# Patient Record
Sex: FEMALE | Race: WHITE | Employment: OTHER | URBAN - METROPOLITAN AREA
[De-identification: names, ages, dates, MRNs, and addresses within clinical notes are randomized per-mention and may not be internally consistent; named-entity substitution may affect disease eponyms.]

---

## 2019-08-27 ENCOUNTER — HOSPITAL ENCOUNTER (OUTPATIENT)
Dept: INFUSION THERAPY | Age: 74
Discharge: HOME | End: 2019-08-27
Payer: MEDICARE

## 2019-08-27 VITALS
SYSTOLIC BLOOD PRESSURE: 144 MMHG | OXYGEN SATURATION: 96 % | RESPIRATION RATE: 18 BRPM | TEMPERATURE: 98.3 F | DIASTOLIC BLOOD PRESSURE: 84 MMHG | HEART RATE: 66 BPM

## 2019-08-27 PROCEDURE — 96372 THER/PROPH/DIAG INJ SC/IM: CPT

## 2019-08-27 PROCEDURE — 74011250636 HC RX REV CODE- 250/636: Performed by: NURSE PRACTITIONER

## 2019-08-27 RX ADMIN — BENRALIZUMAB 30 MG: 30 INJECTION, SOLUTION SUBCUTANEOUS at 13:23

## 2019-08-27 NOTE — PROGRESS NOTES
[facility] Progress Note    Date: 2019    Name: Aly Nassar    MRN: [id number]         : 1945     Fasnera injection Q 4 weeks 1 of 3      Ms. Gentile arrived to [facility] at . Ms. Lou Hartman was assessed and education was provided. Ms. Leo Carr vitals were reviewed. Visit Vitals  /84 (BP 1 Location: Right arm, BP Patient Position: Sitting)   Pulse 66   Temp 98.3 °F (36.8 °C)   Resp 18   SpO2 96%   Breastfeeding? No       Fasenra 30 mg was administered as ordered SQ in patient's Left upper arm ( left). Ms. Lou Hartman tolerated well without complaints. Ms. Lou Hartman was discharged from [place] [address] in stable condition at 1400. She is to return on 2019 at 1300 for her next appointment.     Bettye Blackwood RN  2019

## 2019-09-24 ENCOUNTER — HOSPITAL ENCOUNTER (OUTPATIENT)
Dept: INFUSION THERAPY | Age: 74
Discharge: HOME | End: 2019-09-24
Payer: MEDICARE

## 2019-09-24 VITALS
OXYGEN SATURATION: 97 % | RESPIRATION RATE: 18 BRPM | SYSTOLIC BLOOD PRESSURE: 128 MMHG | DIASTOLIC BLOOD PRESSURE: 74 MMHG | HEART RATE: 59 BPM | TEMPERATURE: 97.7 F

## 2019-09-24 PROCEDURE — 96372 THER/PROPH/DIAG INJ SC/IM: CPT

## 2019-09-24 PROCEDURE — 74011250636 HC RX REV CODE- 250/636: Performed by: NURSE PRACTITIONER

## 2019-09-24 RX ADMIN — BENRALIZUMAB 30 MG: 30 INJECTION, SOLUTION SUBCUTANEOUS at 13:09

## 2019-09-24 NOTE — PROGRESS NOTES
[facility] Progress Note    Date: 2019    Name: Lorie Peralta    MRN: [id number]         : 1945     Fasnera injection Q 4 weeks 2 of 3      Ms. Gentile arrived to [facility] at [id number]. Ms. Jesi Lloyd was assessed and education was provided. Ms. Ander Olivares vitals were reviewed. Visit Vitals  /74 (BP 1 Location: Left arm, BP Patient Position: Sitting)   Pulse (!) 59   Temp 97.7 °F (36.5 °C)   Resp 18   SpO2 97%       Fasenra 30 mg was administered as ordered SQ in patient's Left upper arm ( left). Ms. Jesi Lloyd tolerated well without complaints. Ms. Jesi Lloyd was discharged from [place] [address] in stable condition at 1320. She is to return on 10/22/2019 at 1100 for her next appointment.     Brett De Souza, RN, RN  2019

## 2019-10-22 ENCOUNTER — HOSPITAL ENCOUNTER (OUTPATIENT)
Dept: INFUSION THERAPY | Age: 74
Discharge: HOME | End: 2019-10-22
Payer: MEDICARE

## 2019-10-22 VITALS
RESPIRATION RATE: 18 BRPM | SYSTOLIC BLOOD PRESSURE: 113 MMHG | HEART RATE: 65 BPM | DIASTOLIC BLOOD PRESSURE: 72 MMHG | TEMPERATURE: 98 F | OXYGEN SATURATION: 95 %

## 2019-10-22 PROCEDURE — 74011250636 HC RX REV CODE- 250/636: Performed by: NURSE PRACTITIONER

## 2019-10-22 PROCEDURE — 96372 THER/PROPH/DIAG INJ SC/IM: CPT

## 2019-10-22 RX ADMIN — BENRALIZUMAB 30 MG: 30 INJECTION, SOLUTION SUBCUTANEOUS at 11:19

## 2019-10-22 NOTE — PROGRESS NOTES
[facility] Progress Note    Date: 2019    Name: Rose Mary Bauer    MRN: [id number]         : 1945     Fasnera injection Q 4 weeks 3 of 3      Ms. Gentile arrived to [facility] at 1100. Ms. Kimi Cervantes was assessed and education was provided. Ms. Domonique Culver vitals were reviewed. Visit Vitals  /72 (BP 1 Location: Left arm, BP Patient Position: Sitting)   Pulse 65   Temp 98 °F (36.7 °C)   Resp 18   SpO2 95%       Fasenra 30 mg was administered as ordered SQ in patient's Left upper arm ( left). Ms. Kimi Cervantes tolerated well without complaints. Ms. Kimi Cervantes was discharged from [place] [address] in stable condition at 1125. She is to return on 2019 at 1100 for her next appointment.     Moraima Valdes RN  2019

## 2019-12-10 PROBLEM — J45.50 SEVERE PERSISTENT ASTHMA WITHOUT COMPLICATION: Status: ACTIVE | Noted: 2019-12-10

## 2019-12-17 ENCOUNTER — HOSPITAL ENCOUNTER (OUTPATIENT)
Dept: INFUSION THERAPY | Age: 74
Discharge: HOME | End: 2019-12-17
Payer: MEDICARE

## 2019-12-17 VITALS
RESPIRATION RATE: 18 BRPM | OXYGEN SATURATION: 97 % | DIASTOLIC BLOOD PRESSURE: 72 MMHG | SYSTOLIC BLOOD PRESSURE: 131 MMHG | TEMPERATURE: 97.7 F | HEART RATE: 69 BPM

## 2019-12-17 DIAGNOSIS — J45.50 SEVERE PERSISTENT ASTHMA WITHOUT COMPLICATION: ICD-10-CM

## 2019-12-17 DIAGNOSIS — J45.50 SEVERE PERSISTENT ASTHMA WITHOUT COMPLICATION: Primary | ICD-10-CM

## 2019-12-17 PROCEDURE — 96372 THER/PROPH/DIAG INJ SC/IM: CPT

## 2019-12-17 PROCEDURE — 74011250636 HC RX REV CODE- 250/636: Performed by: NURSE PRACTITIONER

## 2019-12-17 RX ORDER — RAMIPRIL 10 MG/1
10 CAPSULE ORAL DAILY
COMMUNITY
End: 2021-05-04

## 2019-12-17 RX ORDER — FISH OIL/DHA/EPA 1200-144MG
CAPSULE ORAL
COMMUNITY

## 2019-12-17 RX ORDER — AMPICILLIN TRIHYDRATE 250 MG
600 CAPSULE ORAL
COMMUNITY

## 2019-12-17 RX ORDER — DICLOFENAC SODIUM 10 MG/G
1 GEL TOPICAL AS NEEDED
COMMUNITY

## 2019-12-17 RX ORDER — MENTHOL 3.2 MG
1 LOZENGE MUCOUS MEMBRANE DAILY
COMMUNITY

## 2019-12-17 RX ORDER — ZINC GLUCONATE 10 MG
LOZENGE ORAL
COMMUNITY

## 2019-12-17 RX ORDER — NEBIVOLOL 5 MG/1
5 TABLET ORAL
COMMUNITY
End: 2021-01-12

## 2019-12-17 RX ORDER — ARFORMOTEROL TARTRATE 15 UG/2ML
15 SOLUTION RESPIRATORY (INHALATION) 2 TIMES DAILY
COMMUNITY

## 2019-12-17 RX ORDER — OXYMETAZOLINE HCL 0.05 %
3 SPRAY, NON-AEROSOL (ML) NASAL 3 TIMES DAILY
COMMUNITY

## 2019-12-17 RX ORDER — SPIRONOLACTONE 25 MG/1
50 TABLET ORAL
COMMUNITY

## 2019-12-17 RX ORDER — ERGOCALCIFEROL 1.25 MG/1
50000 CAPSULE ORAL 2 TIMES WEEKLY
COMMUNITY

## 2019-12-17 RX ORDER — MONTELUKAST SODIUM 10 MG/1
10 TABLET ORAL DAILY
COMMUNITY

## 2019-12-17 RX ADMIN — BENRALIZUMAB 30 MG: 30 INJECTION, SOLUTION SUBCUTANEOUS at 11:12

## 2019-12-17 NOTE — PROGRESS NOTES
[facility] Progress Note    Date: 2019    Name: Placido Sam    MRN: [id number]         : 1945     Fasnera injection Q 8 weeks      Ms. Gentile arrived to [facility] at . Ms. Luisito Morgan was assessed and education was provided. Ms. Wiliam Houston vitals were reviewed. Visit Vitals  /72 (BP 1 Location: Left arm, BP Patient Position: Sitting)   Pulse 69   Temp 97.7 °F (36.5 °C)   Resp 18   SpO2 97%       Fasenra 30 mg was administered as ordered SQ in patient's Left upper arm. Ms. Luisito Morgan tolerated well without complaints. Ms. Luisito Morgan was discharged from [place] [address] in stable condition at 1115. She is to return on 20 at 1100 for her next appointment.     Jalil Huynh RN  2019  1111

## 2020-02-09 DIAGNOSIS — J45.50 SEVERE PERSISTENT ASTHMA WITHOUT COMPLICATION: ICD-10-CM

## 2020-02-11 ENCOUNTER — HOSPITAL ENCOUNTER (OUTPATIENT)
Dept: INFUSION THERAPY | Age: 75
Discharge: HOME | End: 2020-02-11
Payer: MEDICARE

## 2020-02-11 VITALS
HEART RATE: 69 BPM | OXYGEN SATURATION: 96 % | DIASTOLIC BLOOD PRESSURE: 73 MMHG | SYSTOLIC BLOOD PRESSURE: 128 MMHG | TEMPERATURE: 97.9 F | RESPIRATION RATE: 17 BRPM

## 2020-02-11 DIAGNOSIS — J45.50 SEVERE PERSISTENT ASTHMA WITHOUT COMPLICATION: Primary | ICD-10-CM

## 2020-02-11 PROCEDURE — 74011250636 HC RX REV CODE- 250/636: Performed by: NURSE PRACTITIONER

## 2020-02-11 PROCEDURE — 96372 THER/PROPH/DIAG INJ SC/IM: CPT

## 2020-02-11 RX ADMIN — BENRALIZUMAB 30 MG: 30 INJECTION, SOLUTION SUBCUTANEOUS at 12:00

## 2020-02-11 NOTE — PROGRESS NOTES
[facility] Progress Note    Date: 2020    Name: George Desir    MRN: [id number]         : 1945     Fasnera injection Q 8 weeks      Ms. Gentile arrived to [facility] at [address]. Ms. Randy Patel was assessed and education was provided. Ms. Benavidez Major vitals were reviewed. Visit Vitals  /73 (BP 1 Location: Left arm, BP Patient Position: Sitting)   Pulse 69   Temp 97.9 °F (36.6 °C)   Resp 17   SpO2 96%       Fasenra 30 mg was administered as ordered SQ in patient's Left upper arm. Ms. Randy Patel tolerated well without complaints. Ms. Randy Patel was discharged from [place] [address] in stable condition at 1125. She is to return on 2020 at 1100 for her next appointment.     Ángela Agarwal RN  2020  1125

## 2020-04-05 DIAGNOSIS — J45.50 SEVERE PERSISTENT ASTHMA WITHOUT COMPLICATION: ICD-10-CM

## 2020-04-07 ENCOUNTER — HOSPITAL ENCOUNTER (OUTPATIENT)
Dept: INFUSION THERAPY | Age: 75
Discharge: HOME | End: 2020-04-07
Payer: MEDICARE

## 2020-04-07 VITALS
RESPIRATION RATE: 17 BRPM | TEMPERATURE: 98.3 F | SYSTOLIC BLOOD PRESSURE: 130 MMHG | HEART RATE: 74 BPM | DIASTOLIC BLOOD PRESSURE: 74 MMHG | OXYGEN SATURATION: 99 %

## 2020-04-07 DIAGNOSIS — J45.50 SEVERE PERSISTENT ASTHMA WITHOUT COMPLICATION: Primary | ICD-10-CM

## 2020-04-07 PROCEDURE — 74011250636 HC RX REV CODE- 250/636: Performed by: NURSE PRACTITIONER

## 2020-04-07 PROCEDURE — 96372 THER/PROPH/DIAG INJ SC/IM: CPT

## 2020-04-07 RX ADMIN — BENRALIZUMAB 30 MG: 30 INJECTION, SOLUTION SUBCUTANEOUS at 11:08

## 2020-04-07 NOTE — PROGRESS NOTES
[facility] Progress Note    Date: 2020    Name: Johan Davis    MRN: [id number]         : 1945     Fasnera injection Q 8 weeks      Ms. Gentile arrived to [facility] at 12. Ms. Justo Chavez was assessed and education was provided. Ms. Jose Enrique Grady vitals were reviewed. Visit Vitals  /74 (BP 1 Location: Left arm, BP Patient Position: Sitting)   Pulse 74   Temp 98.3 °F (36.8 °C)   Resp 17   SpO2 99%   Breastfeeding No       Fasenra 30 mg was administered as ordered SQ in patient's Left upper arm. Ms. Justo Chavez tolerated well without complaints. Ms. Justo Chavez was discharged from [facility] in stable condition at 1115. She is to return on 2020 at 56 for her next appointment.     Sarah Mustafa RN  2020  1115

## 2020-05-31 DIAGNOSIS — J45.50 SEVERE PERSISTENT ASTHMA WITHOUT COMPLICATION: ICD-10-CM

## 2020-06-02 ENCOUNTER — HOSPITAL ENCOUNTER (OUTPATIENT)
Dept: INFUSION THERAPY | Age: 75
Discharge: HOME | End: 2020-06-02
Payer: MEDICARE

## 2020-06-02 VITALS
SYSTOLIC BLOOD PRESSURE: 148 MMHG | HEART RATE: 66 BPM | OXYGEN SATURATION: 95 % | DIASTOLIC BLOOD PRESSURE: 81 MMHG | TEMPERATURE: 97.8 F | RESPIRATION RATE: 18 BRPM

## 2020-06-02 DIAGNOSIS — J45.50 SEVERE PERSISTENT ASTHMA WITHOUT COMPLICATION: Primary | ICD-10-CM

## 2020-06-02 PROCEDURE — 96372 THER/PROPH/DIAG INJ SC/IM: CPT

## 2020-06-02 PROCEDURE — 74011250636 HC RX REV CODE- 250/636: Performed by: NURSE PRACTITIONER

## 2020-06-02 RX ADMIN — BENRALIZUMAB 30 MG: 30 INJECTION, SOLUTION SUBCUTANEOUS at 10:33

## 2020-07-26 DIAGNOSIS — J45.50 SEVERE PERSISTENT ASTHMA WITHOUT COMPLICATION: ICD-10-CM

## 2020-07-28 ENCOUNTER — HOSPITAL ENCOUNTER (OUTPATIENT)
Dept: INFUSION THERAPY | Age: 75
Discharge: HOME | End: 2020-07-28
Payer: MEDICARE

## 2020-07-28 VITALS
RESPIRATION RATE: 18 BRPM | SYSTOLIC BLOOD PRESSURE: 132 MMHG | DIASTOLIC BLOOD PRESSURE: 72 MMHG | TEMPERATURE: 98.2 F | OXYGEN SATURATION: 95 % | HEART RATE: 67 BPM

## 2020-07-28 DIAGNOSIS — J45.50 SEVERE PERSISTENT ASTHMA WITHOUT COMPLICATION: Primary | ICD-10-CM

## 2020-07-28 PROCEDURE — 74011250636 HC RX REV CODE- 250/636: Performed by: NURSE PRACTITIONER

## 2020-07-28 PROCEDURE — 96372 THER/PROPH/DIAG INJ SC/IM: CPT

## 2020-07-28 RX ADMIN — BENRALIZUMAB 30 MG: 30 INJECTION, SOLUTION SUBCUTANEOUS at 11:10

## 2020-07-28 NOTE — PROGRESS NOTES
[facility] Progress Note    Date: 2020    Name: Tariq Mead    MRN: [id number]         : 1945     Fasnera injection Q 8 weeks      Ms. Gentile arrived to [place] at 12    MsMegan Gentile was assessed and education was provided. Ms. Babita Correa vitals were reviewed. Visit Vitals  /72 (BP 1 Location: Left arm, BP Patient Position: Sitting)   Pulse 67   Temp 98.2 °F (36.8 °C)   Resp 18   SpO2 95%       Fasenra 30 mg was administered as ordered SQ in patient's left upper arm. Ms. Nina Faustin tolerated well without complaints. Ms. Nina Faustin was discharged from [facility] 58 in stable condition at 1115  She is to return on 2020 at 56 for her next appointment.     Marina Manning RN  2020  1118

## 2020-09-22 ENCOUNTER — HOSPITAL ENCOUNTER (OUTPATIENT)
Dept: INFUSION THERAPY | Age: 75
Discharge: HOME | End: 2020-09-22
Payer: MEDICARE

## 2020-09-22 VITALS
OXYGEN SATURATION: 97 % | RESPIRATION RATE: 18 BRPM | DIASTOLIC BLOOD PRESSURE: 86 MMHG | TEMPERATURE: 97.1 F | HEART RATE: 89 BPM | SYSTOLIC BLOOD PRESSURE: 131 MMHG

## 2020-09-22 DIAGNOSIS — J45.50 SEVERE PERSISTENT ASTHMA WITHOUT COMPLICATION: Primary | ICD-10-CM

## 2020-09-22 PROCEDURE — 74011250636 HC RX REV CODE- 250/636: Performed by: NURSE PRACTITIONER

## 2020-09-22 PROCEDURE — 96372 THER/PROPH/DIAG INJ SC/IM: CPT

## 2020-09-22 RX ADMIN — BENRALIZUMAB 30 MG: 30 INJECTION, SOLUTION SUBCUTANEOUS at 10:58

## 2020-09-22 NOTE — PROGRESS NOTES
[facility] Progress Note    Date: 2020    Name: Che Shelby    MRN: [id number]         : 1945     Fasnera injection Q 8 weeks      Ms. Gentile arrived to [facility] at 1055. Ms. Corrinne He was assessed and education was provided. Ms. David Memos vitals were reviewed. Visit Vitals  /86 (BP 1 Location: Left arm, BP Patient Position: Sitting)   Pulse 89   Temp 97.1 °F (36.2 °C)   Resp 18   SpO2 97%       Fasenra 30 mg was administered as ordered SQ in patient's left upper arm. Ms. Corrinne He tolerated well without complaints. Ms. Corrinne He was discharged from [place] [address] in stable condition at   She is to return on 2020 at 56 for her next appointment.     Rodrigo Beasley RN  2020  1100

## 2020-11-17 ENCOUNTER — HOSPITAL ENCOUNTER (OUTPATIENT)
Dept: INFUSION THERAPY | Age: 75
Discharge: HOME | End: 2020-11-17
Payer: MEDICARE

## 2020-11-17 VITALS
RESPIRATION RATE: 18 BRPM | TEMPERATURE: 98.2 F | HEART RATE: 95 BPM | DIASTOLIC BLOOD PRESSURE: 69 MMHG | OXYGEN SATURATION: 97 % | SYSTOLIC BLOOD PRESSURE: 147 MMHG

## 2020-11-17 DIAGNOSIS — J45.50 SEVERE PERSISTENT ASTHMA WITHOUT COMPLICATION: Primary | ICD-10-CM

## 2020-11-17 PROCEDURE — 74011250636 HC RX REV CODE- 250/636: Performed by: NURSE PRACTITIONER

## 2020-11-17 PROCEDURE — 96372 THER/PROPH/DIAG INJ SC/IM: CPT

## 2020-11-17 RX ADMIN — BENRALIZUMAB 30 MG: 30 INJECTION, SOLUTION SUBCUTANEOUS at 10:47

## 2020-11-17 NOTE — PROGRESS NOTES
[facility] Progress Note    Date: 2020    Name: Genet Churchill    MRN: [id number]         : 1945     Fasnera injection Q 8 weeks      Ms. Gentile arrived to [place] at [phone]. Ms. Lazaro Bhatia was assessed and education was provided. Ms. Castro Isabel vitals were reviewed. Visit Vitals  BP (!) 147/69 (BP 1 Location: Left arm, BP Patient Position: Sitting)   Pulse 95   Temp 98.2 °F (36.8 °C)   Resp 18   SpO2 97%       Fasenra 30 mg was administered as ordered SQ in patient's left upper arm. Ms. Lazaro Bhatia tolerated well without complaints. Ms. Lazaro Bhatia was discharged from [place] [address] in stable condition at 1050. She is to return on 2021 at 1330 for her next appointment.     Chad Jameson RN  2020  1050

## 2021-01-12 ENCOUNTER — HOSPITAL ENCOUNTER (OUTPATIENT)
Dept: INFUSION THERAPY | Age: 76
Discharge: HOME | End: 2021-01-12
Payer: MEDICARE

## 2021-01-12 VITALS
RESPIRATION RATE: 18 BRPM | SYSTOLIC BLOOD PRESSURE: 126 MMHG | HEART RATE: 97 BPM | OXYGEN SATURATION: 97 % | TEMPERATURE: 98.5 F | DIASTOLIC BLOOD PRESSURE: 82 MMHG

## 2021-01-12 DIAGNOSIS — J45.50 SEVERE PERSISTENT ASTHMA WITHOUT COMPLICATION: Primary | ICD-10-CM

## 2021-01-12 PROCEDURE — 96372 THER/PROPH/DIAG INJ SC/IM: CPT

## 2021-01-12 PROCEDURE — 74011250636 HC RX REV CODE- 250/636: Performed by: NURSE PRACTITIONER

## 2021-01-12 RX ORDER — DILTIAZEM HYDROCHLORIDE 120 MG/1
240 CAPSULE, EXTENDED RELEASE ORAL 2 TIMES DAILY
COMMUNITY

## 2021-01-12 RX ADMIN — BENRALIZUMAB 30 MG: 30 INJECTION, SOLUTION SUBCUTANEOUS at 13:45

## 2021-03-09 ENCOUNTER — HOSPITAL ENCOUNTER (OUTPATIENT)
Dept: INFUSION THERAPY | Age: 76
Discharge: HOME | End: 2021-03-09
Payer: MEDICARE

## 2021-03-09 VITALS
DIASTOLIC BLOOD PRESSURE: 84 MMHG | SYSTOLIC BLOOD PRESSURE: 153 MMHG | OXYGEN SATURATION: 98 % | RESPIRATION RATE: 18 BRPM | HEART RATE: 77 BPM | TEMPERATURE: 98.8 F

## 2021-03-09 DIAGNOSIS — J45.50 SEVERE PERSISTENT ASTHMA WITHOUT COMPLICATION: Primary | ICD-10-CM

## 2021-03-09 PROCEDURE — 74011250636 HC RX REV CODE- 250/636: Performed by: NURSE PRACTITIONER

## 2021-03-09 PROCEDURE — 96372 THER/PROPH/DIAG INJ SC/IM: CPT

## 2021-03-09 RX ORDER — FLECAINIDE ACETATE 100 MG/1
100 TABLET ORAL 2 TIMES DAILY
COMMUNITY

## 2021-03-09 RX ADMIN — BENRALIZUMAB 30 MG: 30 INJECTION, SOLUTION SUBCUTANEOUS at 13:35

## 2021-03-09 NOTE — PROGRESS NOTES
[facility] Progress Note    Date: 2021    Name: Komal Hemphill    MRN: [id number]         : 1945     Nan Kirill Injection (Q 8 weeks)      Ms. Matthew Morse arrived ambulatory and in no acute distress, to [facility], at 1330. Ms. Matthew Morse was assessed and education was provided. Patient denied any complaints of pain/discomfort, recent changes to her medication regimen or health condition. Ms. Kee Menendez vitals were reviewed. Visit Vitals  BP (!) 153/84 (BP 1 Location: Left upper arm, BP Patient Position: Sitting)   Pulse 77   Temp 98.8 °F (37.1 °C)   Resp 18   SpO2 98%   Breastfeeding No     Fasenra 30 mg inj., was administered SQ to posterior aspect of patient's left arm. Site without evidence of bleeding or swelling. Bandaid applied to injection site. Ms. Matthew Morse tolerated injection well and without complaints. Ms. Matthew Morse was discharged from [place] [address] in stable condition at 1340. She is to return on 2021 at 1330 for her next appointment.     Kenyatta Bhatt  2021  1050

## 2021-05-04 ENCOUNTER — HOSPITAL ENCOUNTER (OUTPATIENT)
Dept: INFUSION THERAPY | Age: 76
Discharge: HOME | End: 2021-05-04
Payer: MEDICARE

## 2021-05-04 VITALS
DIASTOLIC BLOOD PRESSURE: 65 MMHG | HEART RATE: 69 BPM | SYSTOLIC BLOOD PRESSURE: 142 MMHG | TEMPERATURE: 99 F | RESPIRATION RATE: 18 BRPM | OXYGEN SATURATION: 96 %

## 2021-05-04 DIAGNOSIS — J45.50 SEVERE PERSISTENT ASTHMA WITHOUT COMPLICATION: Primary | ICD-10-CM

## 2021-05-04 PROCEDURE — 96372 THER/PROPH/DIAG INJ SC/IM: CPT

## 2021-05-04 PROCEDURE — 74011250636 HC RX REV CODE- 250/636: Performed by: NURSE PRACTITIONER

## 2021-05-04 RX ORDER — TELMISARTAN 40 MG/1
40 TABLET ORAL DAILY
COMMUNITY

## 2021-05-04 RX ADMIN — BENRALIZUMAB 30 MG: 30 INJECTION, SOLUTION SUBCUTANEOUS at 13:22

## 2021-05-04 NOTE — PROGRESS NOTES
[facility] Progress Note    Date: May 4, 2021    Name: Casper Negrete    MRN: [id number]         : 1945     Pao Lian Injection (Q 8 weeks)      Ms. Kim Sosa arrived ambulatory and in no acute distress, to [facility], at 18. Ms. Kim Sosa was assessed and education was provided. Patient denied any complaints of pain/discomfort, recent changes to her medication regimen or health condition. Ms. Veronica Hernadez vitals were reviewed. Visit Vitals  BP (!) 142/65 (BP 1 Location: Left upper arm, BP Patient Position: Sitting)   Pulse 69   Temp 99 °F (37.2 °C)   Resp 18   SpO2 96%     Fasenra 30 mg inj., was administered SQ to posterior aspect of patient's left arm. Site without evidence of bleeding or swelling. Bandaid applied to injection site. Ms. Kim Sosa tolerated injection well and without complaints. Ms. Kim Sosa was discharged from [place] [address] in stable condition at 1325. She is to return on 2021 at 1330 for her next appointment.     Columba Schwartz  May 4, 2021  1050

## 2021-06-29 ENCOUNTER — HOSPITAL ENCOUNTER (OUTPATIENT)
Dept: INFUSION THERAPY | Age: 76
Discharge: HOME | End: 2021-06-29
Payer: MEDICARE

## 2021-06-29 VITALS
SYSTOLIC BLOOD PRESSURE: 126 MMHG | HEART RATE: 61 BPM | RESPIRATION RATE: 18 BRPM | DIASTOLIC BLOOD PRESSURE: 62 MMHG | OXYGEN SATURATION: 98 % | TEMPERATURE: 98.3 F

## 2021-06-29 DIAGNOSIS — J45.50 SEVERE PERSISTENT ASTHMA WITHOUT COMPLICATION: Primary | ICD-10-CM

## 2021-06-29 PROCEDURE — 74011250636 HC RX REV CODE- 250/636: Performed by: NURSE PRACTITIONER

## 2021-06-29 PROCEDURE — 96372 THER/PROPH/DIAG INJ SC/IM: CPT

## 2021-06-29 RX ADMIN — BENRALIZUMAB 30 MG: 30 INJECTION, SOLUTION SUBCUTANEOUS at 13:44

## 2021-06-29 NOTE — PROGRESS NOTES
[facility] Progress Note    Date: 2021    Name: Kelvin Aguirre    MRN: [id number]         : 1945     Tresia Menvalerieini Injection (Q 8 weeks)      Ms. Andres Sanders arrived ambulatory and in no acute distress, to [facility], at 1340. Ms. Andres Sanders was assessed and education was provided. Patient denied any complaints of pain/discomfort, recent changes to her medication regimen or health condition. Ms. Christofer Plascencia vitals were reviewed. Visit Vitals  /62 (BP 1 Location: Left arm, BP Patient Position: Sitting)   Pulse 61   Temp 98.3 °F (36.8 °C)   Resp 18   SpO2 98%   Breastfeeding No     Fasenra 30 mg was administered SQ to posterior aspect of patient's left arm. Site without evidence of bleeding or swelling. Bandaid applied to injection site. Ms. Andres Sanders tolerated injection well and without complaints. Ms. Andres Sanders was discharged from [place] [address] in stable condition at [phone]. She is to return on 21 at 1330 for her next appointment.     Agata Hinton RN  2021

## 2021-08-24 ENCOUNTER — HOSPITAL ENCOUNTER (OUTPATIENT)
Dept: INFUSION THERAPY | Age: 76
Discharge: HOME | End: 2021-08-24
Payer: MEDICARE

## 2021-08-24 VITALS
HEART RATE: 77 BPM | SYSTOLIC BLOOD PRESSURE: 141 MMHG | TEMPERATURE: 97.4 F | DIASTOLIC BLOOD PRESSURE: 74 MMHG | RESPIRATION RATE: 18 BRPM | OXYGEN SATURATION: 95 %

## 2021-08-24 DIAGNOSIS — J45.50 SEVERE PERSISTENT ASTHMA WITHOUT COMPLICATION: Primary | ICD-10-CM

## 2021-08-24 PROCEDURE — 96372 THER/PROPH/DIAG INJ SC/IM: CPT

## 2021-08-24 PROCEDURE — 74011250636 HC RX REV CODE- 250/636: Performed by: NURSE PRACTITIONER

## 2021-08-24 RX ADMIN — BENRALIZUMAB 30 MG: 30 INJECTION, SOLUTION SUBCUTANEOUS at 11:49

## 2021-08-24 NOTE — PROGRESS NOTES
[facility] Progress Note    Date: 2021    Name: Marcelino Plascencia    MRN: [id number]         : 1945     Fasnera injection Q 8 weeks      Ms. Gentile arrived to [facility] at [phone]. Ms. Pako Milton was assessed and education was provided. Ms. Danii Love vitals were reviewed. Visit Vitals  BP (!) 141/74 (BP 1 Location: Left upper arm, BP Patient Position: Sitting)   Pulse 77   Temp 97.4 °F (36.3 °C)   Resp 18   SpO2 95%       Fasenra 30 mg was administered as ordered SQ in patient's left upper arm. Ms. Pako Milton tolerated well without complaints. Ms. Pako Milton was discharged from [place] [address] in stable condition at 1155. She is to return on 10/19/2021 at 1330 for her next appointment.     Debora Bonner RN  2021  1155

## 2021-10-19 ENCOUNTER — HOSPITAL ENCOUNTER (OUTPATIENT)
Dept: INFUSION THERAPY | Age: 76
Discharge: HOME | End: 2021-10-19
Payer: MEDICARE

## 2021-10-19 VITALS
RESPIRATION RATE: 18 BRPM | TEMPERATURE: 97.8 F | HEART RATE: 68 BPM | DIASTOLIC BLOOD PRESSURE: 73 MMHG | OXYGEN SATURATION: 97 % | SYSTOLIC BLOOD PRESSURE: 132 MMHG

## 2021-10-19 DIAGNOSIS — J45.50 SEVERE PERSISTENT ASTHMA WITHOUT COMPLICATION: Primary | ICD-10-CM

## 2021-10-19 PROCEDURE — 74011250636 HC RX REV CODE- 250/636: Performed by: INTERNAL MEDICINE

## 2021-10-19 PROCEDURE — 96372 THER/PROPH/DIAG INJ SC/IM: CPT

## 2021-10-19 RX ADMIN — BENRALIZUMAB 30 MG: 30 INJECTION, SOLUTION SUBCUTANEOUS at 13:44

## 2021-10-19 NOTE — PROGRESS NOTES
[facility] Progress Note    Date: 2021    Name: Angel Styles    MRN: [id number]         : 1945     Emmanuel Dials Injection (Q 8 weeks)      Ms. Harshil Thacker arrived ambulatory and in no acute distress, to [facility], at 80. Ms. Harshil Thacker was assessed and education was provided. Patient denied any complaints of pain/discomfort, recent changes to her medication regimen or health condition. Ms. Janis Sosa vitals were reviewed. Visit Vitals  /73 (BP 1 Location: Left upper arm, BP Patient Position: Sitting)   Pulse 68   Temp 97.8 °F (36.6 °C)   Resp 18   SpO2 97%   Breastfeeding No     Fasenra 30 mg inj., was administered SQ to posterior aspect of patient's right arm. Site without evidence of bleeding or swelling. Bandaid applied to injection site. Ms. Harshil Thacker tolerated injection well and without complaints. Ms. Harshil Thacker was discharged from [place] [address] in stable condition at [phone]. She is to return on 2021 at 1330 for her next appointment.     Ana Oneal RN  2021  1:53 PM

## 2021-12-14 ENCOUNTER — HOSPITAL ENCOUNTER (OUTPATIENT)
Dept: INFUSION THERAPY | Age: 76
Discharge: HOME | End: 2021-12-14
Payer: MEDICARE

## 2021-12-14 VITALS
DIASTOLIC BLOOD PRESSURE: 79 MMHG | HEART RATE: 76 BPM | TEMPERATURE: 97.1 F | OXYGEN SATURATION: 97 % | RESPIRATION RATE: 18 BRPM | SYSTOLIC BLOOD PRESSURE: 147 MMHG

## 2021-12-14 DIAGNOSIS — J45.50 SEVERE PERSISTENT ASTHMA WITHOUT COMPLICATION: Primary | ICD-10-CM

## 2021-12-14 PROCEDURE — 96372 THER/PROPH/DIAG INJ SC/IM: CPT

## 2021-12-14 PROCEDURE — 74011250636 HC RX REV CODE- 250/636: Performed by: INTERNAL MEDICINE

## 2021-12-14 RX ORDER — LISINOPRIL 20 MG/1
20 TABLET ORAL DAILY
COMMUNITY

## 2021-12-14 RX ADMIN — BENRALIZUMAB 30 MG: 30 INJECTION, SOLUTION SUBCUTANEOUS at 13:49

## 2021-12-14 NOTE — PROGRESS NOTES
[facility] Progress Note    Date: 2021    Name: Po Jimenez    MRN: [id number]         : 1945     Abler Vanderburgh Injection (Q 8 weeks)      Ms. Val Reynolds arrived ambulatory and in no acute distress, to [facility], at 1340. Ms. Val Reynolds was assessed and education was provided. Patient denied any complaints of pain/discomfort, recent changes to her medication regimen or health condition. Ms. Ac Ra vitals were reviewed. Visit Vitals  BP (!) 147/79 (BP 1 Location: Left arm, BP Patient Position: Sitting)   Pulse 76   Temp 97.1 °F (36.2 °C)   Resp 18   SpO2 97%     Fasenra 30 mg was administered SQ to posterior aspect of patient's left arm. Site without evidence of bleeding or swelling. Bandaid applied to injection site. Ms. Val Reynolds tolerated injection well and without complaints. Ms. Val Reynolds was discharged from [place] [address] in stable condition at 1350. She is to return on 22 at 1400 for her next appointment.     Jose R Kendrick RN  2021

## 2022-02-08 ENCOUNTER — HOSPITAL ENCOUNTER (OUTPATIENT)
Dept: INFUSION THERAPY | Age: 77
Discharge: HOME | End: 2022-02-08
Payer: MEDICARE

## 2022-02-08 VITALS
DIASTOLIC BLOOD PRESSURE: 67 MMHG | HEART RATE: 69 BPM | SYSTOLIC BLOOD PRESSURE: 133 MMHG | RESPIRATION RATE: 18 BRPM | OXYGEN SATURATION: 94 % | TEMPERATURE: 98 F

## 2022-02-08 DIAGNOSIS — J45.50 SEVERE PERSISTENT ASTHMA WITHOUT COMPLICATION: Primary | ICD-10-CM

## 2022-02-08 PROCEDURE — 74011250636 HC RX REV CODE- 250/636: Performed by: INTERNAL MEDICINE

## 2022-02-08 PROCEDURE — 96372 THER/PROPH/DIAG INJ SC/IM: CPT

## 2022-02-08 RX ADMIN — BENRALIZUMAB 30 MG: 30 INJECTION, SOLUTION SUBCUTANEOUS at 14:45

## 2022-02-08 NOTE — PROGRESS NOTES
[facility] Progress Note    Date: 2022    Name: Lauren Mills    MRN: [id number]         : 1945    Marval Nissen Injection     Ms. Gentile arrived to [facility] at , ambulatory and in no acute distress. Ms. Ivory Izaguirre was assessed and education was provided. Ms. Ivory Evans vitals were reviewed. Visit Vitals  /67 (BP 1 Location: Left upper arm, BP Patient Position: Sitting)   Pulse 69   Temp 98 °F (36.7 °C)   Resp 18   SpO2 94%   Breastfeeding No       FASENRA 30mg was administered as ordered SQ in patient's Left upper arm ( left). Ms. Ivory Izaguirre tolerated well without complaints. Discharge/ follow-up instructions discussed w/ pt. Pt verbalized understanding. Pt armband removed & shredded. Ms. Ivory Izaguirre was discharged from [place] [address] in stable condition at [phone]. She is to return on 22 at 1400 for her next appointment.     Corazon Snell RN  2022

## 2022-04-05 ENCOUNTER — HOSPITAL ENCOUNTER (OUTPATIENT)
Dept: INFUSION THERAPY | Age: 77
Discharge: HOME | End: 2022-04-05
Payer: MEDICARE

## 2022-04-05 VITALS
HEART RATE: 66 BPM | RESPIRATION RATE: 18 BRPM | TEMPERATURE: 98.6 F | SYSTOLIC BLOOD PRESSURE: 112 MMHG | DIASTOLIC BLOOD PRESSURE: 59 MMHG | OXYGEN SATURATION: 97 %

## 2022-04-05 DIAGNOSIS — J45.50 SEVERE PERSISTENT ASTHMA WITHOUT COMPLICATION: Primary | ICD-10-CM

## 2022-04-05 PROCEDURE — 74011250636 HC RX REV CODE- 250/636: Performed by: INTERNAL MEDICINE

## 2022-04-05 PROCEDURE — 96372 THER/PROPH/DIAG INJ SC/IM: CPT

## 2022-04-05 RX ORDER — AZILSARTAN KAMEDOXOMIL 40 MG/1
40 TABLET ORAL DAILY
COMMUNITY

## 2022-04-05 RX ADMIN — BENRALIZUMAB 30 MG: 30 INJECTION, SOLUTION SUBCUTANEOUS at 14:43

## 2022-05-31 ENCOUNTER — HOSPITAL ENCOUNTER (OUTPATIENT)
Dept: INFUSION THERAPY | Age: 77
Discharge: HOME | End: 2022-05-31
Payer: MEDICARE

## 2022-05-31 VITALS
OXYGEN SATURATION: 95 % | DIASTOLIC BLOOD PRESSURE: 62 MMHG | TEMPERATURE: 98.3 F | RESPIRATION RATE: 16 BRPM | SYSTOLIC BLOOD PRESSURE: 116 MMHG | HEART RATE: 62 BPM

## 2022-05-31 DIAGNOSIS — J45.50 SEVERE PERSISTENT ASTHMA WITHOUT COMPLICATION: Primary | ICD-10-CM

## 2022-05-31 PROCEDURE — 74011250636 HC RX REV CODE- 250/636: Performed by: INTERNAL MEDICINE

## 2022-05-31 PROCEDURE — 96372 THER/PROPH/DIAG INJ SC/IM: CPT

## 2022-05-31 RX ORDER — FUROSEMIDE 20 MG/1
20 TABLET ORAL DAILY
COMMUNITY

## 2022-05-31 RX ADMIN — BENRALIZUMAB 30 MG: 30 INJECTION, SOLUTION SUBCUTANEOUS at 15:10

## 2022-05-31 NOTE — PROGRESS NOTES
[facility] Progress Note    Date: May 31, 2022    Name: Carlee Peters    MRN: [id number]         : 1945      [facility] INJECTION    Ms. Radha Valdes arrived to [facility] at 1500 ambulatory. Ms. Radha Valdes was assessed and education was provided. Ms. Eleanor Smith vitals were reviewed. Visit Vitals  /62 (BP 1 Location: Left upper arm)   Pulse 62   Temp 98.3 °F (36.8 °C)   Resp 16   SpO2 95%       Fasenra 30 mg was administered as ordered SQ in patient's Left upper arm ( left) and band aid applied. Ms. Radha Valdes tolerated well without complaints. Discharge/ follow-up instructions discussed w/ pt. Pt verbalized understanding. Pt armband removed & shredded. Ms. Radha Valdes was discharged from [place] [address] in stable condition at 1515. She is to return on 2022 at 1430 for her next appointment.     Nathan Leary RN  May 31, 2022

## 2022-07-26 ENCOUNTER — HOSPITAL ENCOUNTER (OUTPATIENT)
Dept: INFUSION THERAPY | Age: 77
Discharge: HOME | End: 2022-07-26
Payer: MEDICARE

## 2022-07-26 VITALS
OXYGEN SATURATION: 96 % | SYSTOLIC BLOOD PRESSURE: 120 MMHG | DIASTOLIC BLOOD PRESSURE: 68 MMHG | RESPIRATION RATE: 18 BRPM | HEART RATE: 82 BPM | TEMPERATURE: 97.5 F

## 2022-07-26 DIAGNOSIS — J45.50 SEVERE PERSISTENT ASTHMA WITHOUT COMPLICATION: Primary | ICD-10-CM

## 2022-07-26 PROCEDURE — 74011250636 HC RX REV CODE- 250/636: Performed by: INTERNAL MEDICINE

## 2022-07-26 PROCEDURE — 96372 THER/PROPH/DIAG INJ SC/IM: CPT

## 2022-07-26 RX ORDER — DULAGLUTIDE 1.5 MG/.5ML
1.5 INJECTION, SOLUTION SUBCUTANEOUS
COMMUNITY

## 2022-07-26 RX ADMIN — BENRALIZUMAB 30 MG: 30 INJECTION, SOLUTION SUBCUTANEOUS at 14:32

## 2022-07-26 NOTE — PROGRESS NOTES
[facility] Progress Note    Date: 2022    Name: Natalie Keene    MRN: [id number]         : 1945      [facility] INJECTION    Ms. Joan Mckoy arrived to [place] at [phone] ambulatory. Ms. Joan Mckoy was assessed and education was provided. Ms. Maxim Brown vitals were reviewed. Visit Vitals  /68 (BP 1 Location: Left upper arm, BP Patient Position: Sitting)   Pulse 82   Temp 97.5 °F (36.4 °C)   Resp 18   SpO2 96%   Breastfeeding No       Fasenra 30 mg was administered as ordered SQ in patient's Left upper arm ( left) and band aid applied. Ms. Joan Mckoy tolerated well without complaints. Discharge/ follow-up instructions discussed w/ pt. Pt verbalized understanding. Pt armband removed & shredded. Ms. Joan Mckoy was discharged from [place] [address] in stable condition at 1435. She is to return on 2022 at 1430 for her next appointment.     Reina Morillo RN  2022

## 2022-09-20 ENCOUNTER — HOSPITAL ENCOUNTER (OUTPATIENT)
Dept: INFUSION THERAPY | Age: 77
Discharge: HOME | End: 2022-09-20
Payer: MEDICARE

## 2022-09-20 VITALS
HEART RATE: 72 BPM | TEMPERATURE: 97.7 F | DIASTOLIC BLOOD PRESSURE: 57 MMHG | SYSTOLIC BLOOD PRESSURE: 110 MMHG | OXYGEN SATURATION: 95 % | RESPIRATION RATE: 18 BRPM

## 2022-09-20 DIAGNOSIS — J45.50 SEVERE PERSISTENT ASTHMA WITHOUT COMPLICATION: Primary | ICD-10-CM

## 2022-09-20 PROCEDURE — 74011250636 HC RX REV CODE- 250/636: Performed by: INTERNAL MEDICINE

## 2022-09-20 PROCEDURE — 96372 THER/PROPH/DIAG INJ SC/IM: CPT

## 2022-09-20 RX ADMIN — BENRALIZUMAB 30 MG: 30 INJECTION, SOLUTION SUBCUTANEOUS at 14:27

## 2022-09-20 NOTE — PROGRESS NOTES
[facility] Progress Note    Date: 2022    Name: Jada Terry    MRN: [id number]         : 1945      [facility] INJECTION    Ms. Destiny Puentes arrived to [facility] at [phone] ambulatory. Ms. Destiny Puentes was assessed and education was provided. Ms. Jessica Tracy vitals were reviewed. Visit Vitals  BP (!) 110/57 (BP 1 Location: Left upper arm, BP Patient Position: Sitting)   Pulse 72   Temp 97.7 °F (36.5 °C)   Resp 18   SpO2 95%   Breastfeeding No       Fasenra 30 mg was administered as ordered SQ in patient's Left upper arm ( left). Ms. Destiny Puentes tolerated well without complaints. Discharge/ follow-up instructions discussed w/ pt. Pt verbalized understanding. Pt armband removed & shredded. Ms. Destiny Puentes was discharged from [place] [address] in stable condition at 1435. She is to return on 11/15/2022 at 1430 for her next appointment.     Marilyn Thornton RN  2022

## 2022-11-15 ENCOUNTER — HOSPITAL ENCOUNTER (OUTPATIENT)
Dept: INFUSION THERAPY | Age: 77
End: 2022-11-15

## 2022-11-17 ENCOUNTER — HOSPITAL ENCOUNTER (OUTPATIENT)
Dept: INFUSION THERAPY | Age: 77
Discharge: HOME | End: 2022-11-17
Payer: MEDICARE

## 2022-11-17 VITALS
HEART RATE: 71 BPM | TEMPERATURE: 98.2 F | OXYGEN SATURATION: 96 % | DIASTOLIC BLOOD PRESSURE: 62 MMHG | RESPIRATION RATE: 18 BRPM | SYSTOLIC BLOOD PRESSURE: 119 MMHG

## 2022-11-17 DIAGNOSIS — J45.50 SEVERE PERSISTENT ASTHMA WITHOUT COMPLICATION: Primary | ICD-10-CM

## 2022-11-17 PROCEDURE — 74011250636 HC RX REV CODE- 250/636

## 2022-11-17 PROCEDURE — 96372 THER/PROPH/DIAG INJ SC/IM: CPT

## 2022-11-17 RX ORDER — AFLIBERCEPT 40 MG/ML
2 INJECTION, SOLUTION INTRAVITREAL
COMMUNITY

## 2022-11-17 RX ADMIN — BENRALIZUMAB 30 MG: 30 INJECTION, SOLUTION SUBCUTANEOUS at 14:17

## 2022-11-17 NOTE — PROGRESS NOTES
[facility] Progress Note    Date: 2022    Name: Leroy Black    MRN: [id number]         : 1945      [facility] INJECTION    Ms. Aftab Ruiz arrived to [facility] at 1400 ambulatory. Ms. Aftab Ruiz was assessed and education was provided. Ms. Odilia Álvarez vitals were reviewed. Visit Vitals  /62 (BP 1 Location: Left upper arm, BP Patient Position: Sitting)   Pulse 71   Temp 98.2 °F (36.8 °C)   Resp 18   SpO2 96%   Breastfeeding No       Fasenra 30 mg was administered as ordered SQ in patient's Left upper arm ( left). Ms. Aftab Ruiz tolerated well without complaints. Discharge/ follow-up instructions discussed w/ pt. Pt verbalized understanding. Pt armband removed & shredded. Ms. Aftab Ruiz was discharged from [place] [address] in stable condition at [phone]. She is to return on 2023 at 1430 for her next appointment.     Mauro Escoto RN  2022

## 2023-01-10 ENCOUNTER — APPOINTMENT (OUTPATIENT)
Dept: INFUSION THERAPY | Age: 78
End: 2023-01-10

## 2023-01-12 ENCOUNTER — APPOINTMENT (OUTPATIENT)
Dept: INFUSION THERAPY | Age: 78
End: 2023-01-12